# Patient Record
Sex: MALE | Race: WHITE | NOT HISPANIC OR LATINO | Employment: OTHER | URBAN - METROPOLITAN AREA
[De-identification: names, ages, dates, MRNs, and addresses within clinical notes are randomized per-mention and may not be internally consistent; named-entity substitution may affect disease eponyms.]

---

## 2018-08-23 ENCOUNTER — CONVERSION ENCOUNTER (OUTPATIENT)
Dept: FAMILY MEDICINE CLINIC | Facility: CLINIC | Age: 33
End: 2018-08-23

## 2018-08-23 ENCOUNTER — OFFICE VISIT CONVERTED (OUTPATIENT)
Dept: FAMILY MEDICINE CLINIC | Facility: CLINIC | Age: 33
End: 2018-08-23
Attending: NURSE PRACTITIONER

## 2019-02-01 ENCOUNTER — CONVERSION ENCOUNTER (OUTPATIENT)
Dept: FAMILY MEDICINE CLINIC | Facility: CLINIC | Age: 34
End: 2019-02-01

## 2019-02-27 ENCOUNTER — HOSPITAL ENCOUNTER (OUTPATIENT)
Dept: LAB | Facility: HOSPITAL | Age: 34
Discharge: HOME | End: 2019-02-27
Attending: NURSE PRACTITIONER

## 2019-02-27 LAB
25(OH)D3 SERPL-MCNC: 26.1 NG/ML (ref 30–100)
ALBUMIN SERPL-MCNC: 4.5 G/DL (ref 3.5–5)
ALBUMIN/GLOB SERPL: 1.5 {RATIO} (ref 1.4–2.6)
ALP SERPL-CCNC: 85 U/L (ref 53–128)
ALT SERPL-CCNC: 52 U/L (ref 10–40)
ANION GAP SERPL CALC-SCNC: 20 MMOL/L (ref 8–19)
AST SERPL-CCNC: 29 U/L (ref 15–50)
BASOPHILS # BLD AUTO: 0.09 10*3/UL (ref 0–0.2)
BASOPHILS NFR BLD AUTO: 0.8 % (ref 0–3)
BILIRUB SERPL-MCNC: 0.31 MG/DL (ref 0.2–1.3)
BUN SERPL-MCNC: 16 MG/DL (ref 5–25)
BUN/CREAT SERPL: 17 {RATIO} (ref 6–20)
CALCIUM SERPL-MCNC: 9.8 MG/DL (ref 8.7–10.4)
CHLORIDE SERPL-SCNC: 101 MMOL/L (ref 99–111)
CHOLEST SERPL-MCNC: 184 MG/DL (ref 107–200)
CHOLEST/HDLC SERPL: 5.9 {RATIO} (ref 3–6)
CONV ABS IMM GRAN: 0.11 10*3/UL (ref 0–0.2)
CONV CO2: 25 MMOL/L (ref 22–32)
CONV IMMATURE GRAN: 1 % (ref 0–1.8)
CONV TOTAL PROTEIN: 7.5 G/DL (ref 6.3–8.2)
CREAT UR-MCNC: 0.95 MG/DL (ref 0.7–1.2)
DEPRECATED RDW RBC AUTO: 43.2 FL (ref 35.1–43.9)
EOSINOPHIL # BLD AUTO: 0.39 10*3/UL (ref 0–0.7)
EOSINOPHIL # BLD AUTO: 3.5 % (ref 0–7)
ERYTHROCYTE [DISTWIDTH] IN BLOOD BY AUTOMATED COUNT: 12.2 % (ref 11.6–14.4)
GFR SERPLBLD BASED ON 1.73 SQ M-ARVRAT: >60 ML/MIN/{1.73_M2}
GLOBULIN UR ELPH-MCNC: 3 G/DL (ref 2–3.5)
GLUCOSE SERPL-MCNC: 100 MG/DL (ref 70–99)
HBA1C MFR BLD: 15.7 G/DL (ref 14–18)
HCT VFR BLD AUTO: 48.8 % (ref 42–52)
HDLC SERPL-MCNC: 31 MG/DL (ref 40–60)
LDLC SERPL CALC-MCNC: 105 MG/DL (ref 70–100)
LYMPHOCYTES # BLD AUTO: 3.03 10*3/UL (ref 1–5)
MCH RBC QN AUTO: 30.9 PG (ref 27–31)
MCHC RBC AUTO-ENTMCNC: 32.2 G/DL (ref 33–37)
MCV RBC AUTO: 96.1 FL (ref 80–96)
MONOCYTES # BLD AUTO: 0.86 10*3/UL (ref 0.2–1.2)
MONOCYTES NFR BLD AUTO: 7.8 % (ref 3–10)
NEUTROPHILS # BLD AUTO: 6.56 10*3/UL (ref 2–8)
NEUTROPHILS NFR BLD AUTO: 59.5 % (ref 30–85)
NRBC CBCN: 0 % (ref 0–0.7)
OSMOLALITY SERPL CALC.SUM OF ELEC: 293 MOSM/KG (ref 273–304)
PLATELET # BLD AUTO: 296 10*3/UL (ref 130–400)
PMV BLD AUTO: 9.9 FL (ref 9.4–12.4)
POTASSIUM SERPL-SCNC: 4.5 MMOL/L (ref 3.5–5.3)
RBC # BLD AUTO: 5.08 10*6/UL (ref 4.7–6.1)
SODIUM SERPL-SCNC: 141 MMOL/L (ref 135–147)
T4 FREE SERPL-MCNC: 1.1 NG/DL (ref 0.9–1.8)
TRIGL SERPL-MCNC: 505 MG/DL (ref 40–150)
TSH SERPL-ACNC: 1.11 M[IU]/L (ref 0.27–4.2)
VARIANT LYMPHS NFR BLD MANUAL: 27.4 % (ref 20–45)
WBC # BLD AUTO: 11.04 10*3/UL (ref 4.8–10.8)

## 2019-03-12 ENCOUNTER — OFFICE VISIT CONVERTED (OUTPATIENT)
Dept: FAMILY MEDICINE CLINIC | Facility: CLINIC | Age: 34
End: 2019-03-12
Attending: NURSE PRACTITIONER

## 2019-12-30 ENCOUNTER — HOSPITAL ENCOUNTER (OUTPATIENT)
Dept: LAB | Facility: HOSPITAL | Age: 34
Discharge: HOME | End: 2019-12-30
Attending: NURSE PRACTITIONER

## 2019-12-30 LAB
25(OH)D3 SERPL-MCNC: 32.5 NG/ML (ref 30–100)
ALBUMIN SERPL-MCNC: 4.4 G/DL (ref 3.5–5)
ALBUMIN/GLOB SERPL: 1.5 {RATIO} (ref 1.4–2.6)
ALP SERPL-CCNC: 85 U/L (ref 53–128)
ALT SERPL-CCNC: 49 U/L (ref 10–40)
ANION GAP SERPL CALC-SCNC: 19 MMOL/L (ref 8–19)
AST SERPL-CCNC: 27 U/L (ref 15–50)
BASOPHILS # BLD AUTO: 0.06 10*3/UL (ref 0–0.2)
BASOPHILS NFR BLD AUTO: 0.6 % (ref 0–3)
BILIRUB SERPL-MCNC: 0.32 MG/DL (ref 0.2–1.3)
BUN SERPL-MCNC: 14 MG/DL (ref 5–25)
BUN/CREAT SERPL: 16 {RATIO} (ref 6–20)
CALCIUM SERPL-MCNC: 9.5 MG/DL (ref 8.7–10.4)
CHLORIDE SERPL-SCNC: 101 MMOL/L (ref 99–111)
CHOLEST SERPL-MCNC: 115 MG/DL (ref 107–200)
CHOLEST/HDLC SERPL: 4.6 {RATIO} (ref 3–6)
CONV ABS IMM GRAN: 0.03 10*3/UL (ref 0–0.2)
CONV CO2: 23 MMOL/L (ref 22–32)
CONV IMMATURE GRAN: 0.3 % (ref 0–1.8)
CONV TOTAL PROTEIN: 7.4 G/DL (ref 6.3–8.2)
CREAT UR-MCNC: 0.9 MG/DL (ref 0.7–1.2)
DEPRECATED RDW RBC AUTO: 41.5 FL (ref 35.1–43.9)
EOSINOPHIL # BLD AUTO: 0.47 10*3/UL (ref 0–0.7)
EOSINOPHIL # BLD AUTO: 4.5 % (ref 0–7)
ERYTHROCYTE [DISTWIDTH] IN BLOOD BY AUTOMATED COUNT: 12.1 % (ref 11.6–14.4)
EST. AVERAGE GLUCOSE BLD GHB EST-MCNC: 108 MG/DL
FOLATE SERPL-MCNC: >20 NG/ML (ref 4.8–20)
GFR SERPLBLD BASED ON 1.73 SQ M-ARVRAT: >60 ML/MIN/{1.73_M2}
GLOBULIN UR ELPH-MCNC: 3 G/DL (ref 2–3.5)
GLUCOSE SERPL-MCNC: 94 MG/DL (ref 70–99)
HBA1C MFR BLD: 5.4 % (ref 3.5–5.7)
HCT VFR BLD AUTO: 47 % (ref 42–52)
HDLC SERPL-MCNC: 25 MG/DL (ref 40–60)
HGB BLD-MCNC: 15.7 G/DL (ref 14–18)
IRON SERPL-MCNC: 96 UG/DL (ref 70–180)
LDLC SERPL CALC-MCNC: 32 MG/DL (ref 70–100)
LYMPHOCYTES # BLD AUTO: 3.71 10*3/UL (ref 1–5)
LYMPHOCYTES NFR BLD AUTO: 35.6 % (ref 20–45)
MCH RBC QN AUTO: 31.1 PG (ref 27–31)
MCHC RBC AUTO-ENTMCNC: 33.4 G/DL (ref 33–37)
MCV RBC AUTO: 93.1 FL (ref 80–96)
MONOCYTES # BLD AUTO: 0.67 10*3/UL (ref 0.2–1.2)
MONOCYTES NFR BLD AUTO: 6.4 % (ref 3–10)
NEUTROPHILS # BLD AUTO: 5.49 10*3/UL (ref 2–8)
NEUTROPHILS NFR BLD AUTO: 52.6 % (ref 30–85)
NRBC CBCN: 0 % (ref 0–0.7)
OSMOLALITY SERPL CALC.SUM OF ELEC: 288 MOSM/KG (ref 273–304)
PLATELET # BLD AUTO: 264 10*3/UL (ref 130–400)
PMV BLD AUTO: 10 FL (ref 9.4–12.4)
POTASSIUM SERPL-SCNC: 4.2 MMOL/L (ref 3.5–5.3)
RBC # BLD AUTO: 5.05 10*6/UL (ref 4.7–6.1)
SODIUM SERPL-SCNC: 139 MMOL/L (ref 135–147)
TRIGL SERPL-MCNC: 289 MG/DL (ref 40–150)
TSH SERPL-ACNC: 1.59 M[IU]/L (ref 0.27–4.2)
VIT B12 SERPL-MCNC: 738 PG/ML (ref 211–911)
VLDLC SERPL-MCNC: 58 MG/DL (ref 5–37)
WBC # BLD AUTO: 10.43 10*3/UL (ref 4.8–10.8)

## 2021-05-15 VITALS
OXYGEN SATURATION: 98 % | RESPIRATION RATE: 18 BRPM | TEMPERATURE: 98 F | SYSTOLIC BLOOD PRESSURE: 113 MMHG | HEART RATE: 83 BPM | WEIGHT: 226 LBS | DIASTOLIC BLOOD PRESSURE: 76 MMHG | BODY MASS INDEX: 33.47 KG/M2 | HEIGHT: 69 IN

## 2021-05-15 VITALS
WEIGHT: 234 LBS | HEART RATE: 80 BPM | RESPIRATION RATE: 18 BRPM | OXYGEN SATURATION: 100 % | BODY MASS INDEX: 34.66 KG/M2 | SYSTOLIC BLOOD PRESSURE: 134 MMHG | TEMPERATURE: 97.6 F | DIASTOLIC BLOOD PRESSURE: 80 MMHG | HEIGHT: 69 IN

## 2021-05-16 VITALS
SYSTOLIC BLOOD PRESSURE: 122 MMHG | HEIGHT: 69 IN | RESPIRATION RATE: 16 BRPM | WEIGHT: 226 LBS | HEART RATE: 91 BPM | DIASTOLIC BLOOD PRESSURE: 69 MMHG | OXYGEN SATURATION: 97 % | BODY MASS INDEX: 33.47 KG/M2

## 2021-12-17 RX ORDER — LORATADINE 10 MG/1
TABLET ORAL
COMMUNITY
End: 2022-02-17

## 2021-12-17 RX ORDER — OMEGA-3/DHA/EPA/FISH OIL 60 MG-90MG
CAPSULE ORAL
COMMUNITY

## 2021-12-17 RX ORDER — FEXOFENADINE HCL 180 MG/1
TABLET ORAL
COMMUNITY

## 2021-12-20 ENCOUNTER — OFFICE VISIT (OUTPATIENT)
Dept: SURGERY | Facility: CLINIC | Age: 36
End: 2021-12-20

## 2021-12-20 VITALS — HEIGHT: 70 IN | RESPIRATION RATE: 15 BRPM | WEIGHT: 270 LBS | BODY MASS INDEX: 38.65 KG/M2

## 2021-12-20 DIAGNOSIS — R59.1 LYMPHADENOPATHY: Primary | ICD-10-CM

## 2021-12-20 PROBLEM — S09.90XA HEAD INJURY: Status: ACTIVE | Noted: 2021-12-20

## 2021-12-20 PROBLEM — L30.9 ECZEMA: Status: ACTIVE | Noted: 2021-12-20

## 2021-12-20 PROBLEM — R73.01 IMPAIRED FASTING GLUCOSE: Status: ACTIVE | Noted: 2019-03-12

## 2021-12-20 PROBLEM — G43.909 MIGRAINE: Status: ACTIVE | Noted: 2021-12-20

## 2021-12-20 PROBLEM — K80.20 GALLSTONES: Status: ACTIVE | Noted: 2021-12-20

## 2021-12-20 PROBLEM — J01.80 OTHER ACUTE SINUSITIS: Status: ACTIVE | Noted: 2021-12-20

## 2021-12-20 PROBLEM — E55.9 VITAMIN D DEFICIENCY: Status: ACTIVE | Noted: 2019-03-12

## 2021-12-20 PROBLEM — F41.9 ANXIETY: Status: ACTIVE | Noted: 2021-12-20

## 2021-12-20 PROBLEM — E78.5 HYPERLIPIDEMIA: Status: ACTIVE | Noted: 2019-03-12

## 2021-12-20 PROCEDURE — 99203 OFFICE O/P NEW LOW 30 MIN: CPT | Performed by: SURGERY

## 2021-12-20 RX ORDER — VENLAFAXINE HYDROCHLORIDE 150 MG/1
150 CAPSULE, EXTENDED RELEASE ORAL DAILY
COMMUNITY
Start: 2021-11-08

## 2021-12-20 RX ORDER — CETIRIZINE HYDROCHLORIDE 10 MG/1
TABLET ORAL
COMMUNITY
Start: 2020-12-01

## 2021-12-20 RX ORDER — PROPRANOLOL HYDROCHLORIDE 10 MG/1
10 TABLET ORAL 2 TIMES DAILY
COMMUNITY
Start: 2021-10-19

## 2021-12-20 RX ORDER — MELATONIN
COMMUNITY
Start: 2020-12-20

## 2021-12-20 RX ORDER — ERGOCALCIFEROL 1.25 MG/1
50000 CAPSULE ORAL DAILY
COMMUNITY
Start: 2021-09-20 | End: 2022-02-17

## 2021-12-20 RX ORDER — LOSARTAN POTASSIUM 25 MG/1
25 TABLET ORAL EVERY MORNING
COMMUNITY
Start: 2021-10-23

## 2021-12-20 RX ORDER — AMOXICILLIN 875 MG/1
TABLET, COATED ORAL
COMMUNITY
Start: 2021-11-15 | End: 2022-02-17

## 2021-12-20 RX ORDER — TRIAMCINOLONE ACETONIDE 5 MG/G
OINTMENT TOPICAL
COMMUNITY
Start: 2021-12-07

## 2021-12-20 RX ORDER — VENLAFAXINE HYDROCHLORIDE 75 MG/1
75 CAPSULE, EXTENDED RELEASE ORAL
COMMUNITY
Start: 2021-10-11 | End: 2022-02-17

## 2021-12-20 RX ORDER — ATORVASTATIN CALCIUM 40 MG/1
40 TABLET, FILM COATED ORAL
COMMUNITY
Start: 2021-10-11

## 2021-12-20 RX ORDER — AMOXICILLIN AND CLAVULANATE POTASSIUM 875; 125 MG/1; MG/1
1 TABLET, FILM COATED ORAL EVERY 12 HOURS
COMMUNITY
Start: 2021-12-15 | End: 2022-02-17

## 2021-12-20 NOTE — PROGRESS NOTES
Chief Complaint: Swollen Glands    Subjective         History of Present Illness  Lucho Sauer is a 36 y.o. male presents to [facility] to be seen for enlarged cervical lymph node.  His ultrasound showed a 1.4 cm structure in right lateral posterior neck consistent with a benign lymph node.  He does use chewing tobacco and has for several years.  He states that he recently had an ear infection and had multiple lymph nodes with large that if all decreased in size after completing an antibiotic.  He feels that the node in question has also decreased in size since the time of the ultrasound.  We will plan to have another ultrasound in 1 month just to confirm that most likely this is a benign reactive node due to a head neck infection.  There were no lesions seen on oral exam.        SCANNED - IMAGING [[id number]] (Order [id number])  Order  Status: Final result     Appointment Information      Scans on Order [id number]    Scan on 11/16/2021 by [facility] On[facility], [place]: ULTRASOUND(NECK)_[facility] IMAG[facility]_11/16/21                Objective     History reviewed. No pertinent past medical history.    History reviewed. No pertinent surgical history.      Current Outpatient Medications:   •  amoxicillin (AMOXIL) 875 MG tablet, TAKE 1 TABLET BY MOUTH EVERY 12 HOURS FOR 5 DAYS, Disp: , Rfl:   •  amoxicillin-clavulanate (AUGMENTIN) 875-125 MG per tablet, Take 1 tablet by mouth Every 12 (Twelve) Hours., Disp: , Rfl:   •  Ascorbic Acid 500 MG chewable tablet, Vitamin C 500 mg oral tablet,chewable chew 1 tablet by oral route daily   Suspended, Disp: , Rfl:   •  atorvastatin (LIPITOR) 40 MG tablet, Take 40 mg by mouth every night at bedtime., Disp: , Rfl:   •  cetirizine (zyrTEC) 10 MG tablet, , Disp: , Rfl:   •  cholecalciferol (Vitamin D) 25 MCG (1000 UT) tablet, , Disp: , Rfl:   •  fexofenadine (Allegra Allergy) 180 MG tablet, Allegra Allergy 180 mg oral tablet take 1 tablet (180 mg) by oral route once daily  "  Active, Disp: , Rfl:   •  loratadine (Claritin) 10 MG tablet, Claritin 10 mg oral tablet take 1 tablet (10 mg) by oral route once daily   Active, Disp: , Rfl:   •  losartan (COZAAR) 25 MG tablet, Take 25 mg by mouth Every Morning., Disp: , Rfl:   •  Multiple Vitamin (MULTIVITAMINS PO), Multivitamin one po daily   Active, Disp: , Rfl:   •  Omega-3 Fatty Acids (fish oil) 500 MG capsule capsule, Fish Oil 500 mg oral capsule take 1 capsule by oral route 2 times a day   Active, Disp: , Rfl:   •  propranolol (INDERAL) 10 MG tablet, Take 10 mg by mouth 2 (Two) Times a Day., Disp: , Rfl:   •  triamcinolone (KENALOG) 0.5 % ointment, APPLY PEA SIZED AMOUNT TO THE AFFECTED AREA TWICE DAILY, Disp: , Rfl:   •  venlafaxine XR (EFFEXOR-XR) 150 MG 24 hr capsule, Take 150 mg by mouth Daily., Disp: , Rfl:   •  venlafaxine XR (EFFEXOR-XR) 75 MG 24 hr capsule, Take 75 mg by mouth every night at bedtime., Disp: , Rfl:   •  vitamin D (ERGOCALCIFEROL) 1.25 MG (95779 UT) capsule capsule, Take 50,000 Units by mouth Daily., Disp: , Rfl:     Allergies   Allergen Reactions   • Beef Allergy Unknown - High Severity        History reviewed. No pertinent family history.    Social History     Socioeconomic History   • Marital status:    Tobacco Use   • Smoking status: Current Some Day Smoker   • Smokeless tobacco: Current User   Vaping Use   • Vaping Use: Never used       Vital Signs:   Resp 15   Ht 177.8 cm (70\")   Wt 122 kg (270 lb)   BMI 38.74 kg/m²    Review of Systems    Physical Exam  Vitals and nursing note reviewed.   Constitutional:       General: He is not in acute distress.     Appearance: Normal appearance. He is well-developed.   HENT:      Head: Normocephalic and atraumatic.   Eyes:      Extraocular Movements: Extraocular movements intact.      Pupils: Pupils are equal, round, and reactive to light.   Neck:      Comments: Right side mildly enlarged posterior lateral lymph node  Cardiovascular:      Pulses: Normal pulses. "   Pulmonary:      Effort: Pulmonary effort is normal. No retractions.      Breath sounds: Normal air entry. No wheezing.   Abdominal:      General: There is no distension.      Palpations: Abdomen is soft.      Tenderness: There is no abdominal tenderness.      Hernia: No hernia is present.   Musculoskeletal:         General: No swelling or deformity.      Cervical back: Neck supple.   Lymphadenopathy:      Cervical: Cervical adenopathy present.   Skin:     General: Skin is warm and dry.      Findings: No erythema.   Neurological:      General: No focal deficit present.      Mental Status: He is alert and oriented to person, place, and time.      Motor: Motor function is intact.   Psychiatric:         Mood and Affect: Mood normal.         Thought Content: Thought content normal.      Oral exam with no suspicious lesions seen    Result Review :               Assessment and Plan    Diagnoses and all orders for this visit:    1. Lymphadenopathy (Primary)  -     US Head Neck Soft Tissue; Future      Followup after repeat imaging in 1 month    Follow Up   Return in about 2 months (around 2/20/2022), or if symptoms worsen or fail to improve.  Patient was given instructions and counseling regarding his condition or for health maintenance advice. Please see specific information pulled into the AVS if appropriate.         This document has been electronically signed by Jo-Ann Kurtz MD  December 20, 2021 09:11 EST

## 2022-01-20 ENCOUNTER — HOSPITAL ENCOUNTER (OUTPATIENT)
Dept: ULTRASOUND IMAGING | Facility: HOSPITAL | Age: 37
Discharge: HOME | End: 2022-01-20
Admitting: SURGERY

## 2022-01-20 DIAGNOSIS — R59.1 LYMPHADENOPATHY: ICD-10-CM

## 2022-01-20 PROCEDURE — 76536 US EXAM OF HEAD AND NECK: CPT

## 2022-01-25 ENCOUNTER — TELEPHONE (OUTPATIENT)
Dept: SURGERY | Facility: CLINIC | Age: 37
End: 2022-01-25

## 2022-01-25 DIAGNOSIS — R59.1 LYMPHADENOPATHY: Primary | ICD-10-CM

## 2022-01-25 NOTE — TELEPHONE ENCOUNTER
Pts wife called and wanted the results of the patient's ultrasound that was done last week.      [phone]

## 2022-01-26 ENCOUNTER — TELEPHONE (OUTPATIENT)
Dept: OTOLARYNGOLOGY | Facility: CLINIC | Age: 37
End: 2022-01-26

## 2022-01-26 NOTE — TELEPHONE ENCOUNTER
Caller: BALJEET NELSONBS    Relationship: [facility]    Best call back number: [phone]    What orders are you requesting (i.e. lab or imaging): IMAGING-CT SOFT TISSUE NECK    In what timeframe would the patient need to come in: PRIOR TO 2/17/22 APPT WITH     Where will you receive your lab/imaging services:  [facility]    Additional notes: [facility] SCHEDULED NEW PT APPT WITH ORDERS FOR CT PRIOR. PT IS AWAITING AN APPT FOR IMAGING TO BE DONE OR TO KNOW IF THIS IS WALK-IN. WOULD LIKE CALLBACK.

## 2022-02-14 ENCOUNTER — HOSPITAL ENCOUNTER (OUTPATIENT)
Dept: CT IMAGING | Facility: HOSPITAL | Age: 37
Discharge: HOME | End: 2022-02-14
Admitting: SURGERY

## 2022-02-14 DIAGNOSIS — R59.1 LYMPHADENOPATHY: ICD-10-CM

## 2022-02-14 LAB — CREAT BLDA-MCNC: 0.9 MG/DL

## 2022-02-14 PROCEDURE — 0 IOPAMIDOL PER 1 ML: Performed by: SURGERY

## 2022-02-14 PROCEDURE — 70491 CT SOFT TISSUE NECK W/DYE: CPT

## 2022-02-14 PROCEDURE — 82565 ASSAY OF CREATININE: CPT

## 2022-02-14 RX ADMIN — IOPAMIDOL 100 ML: 755 INJECTION, SOLUTION INTRAVENOUS at 10:25

## 2022-02-17 ENCOUNTER — OFFICE VISIT (OUTPATIENT)
Dept: OTOLARYNGOLOGY | Facility: CLINIC | Age: 37
End: 2022-02-17

## 2022-02-17 VITALS — WEIGHT: 262 LBS | HEIGHT: 70 IN | TEMPERATURE: 99.3 F | BODY MASS INDEX: 37.51 KG/M2

## 2022-02-17 DIAGNOSIS — J01.80 ACUTE NON-RECURRENT SINUSITIS OF OTHER SINUS: ICD-10-CM

## 2022-02-17 DIAGNOSIS — R59.1 LYMPHADENOPATHY: Primary | ICD-10-CM

## 2022-02-17 PROCEDURE — 99204 OFFICE O/P NEW MOD 45 MIN: CPT | Performed by: OTOLARYNGOLOGY

## 2022-02-17 NOTE — PROGRESS NOTES
Patient Name: Lucho Sauer   Visit Date: 02/17/2022   Patient ID: [id number]  Provider: Chase Mathew MD    Sex: male  Location: [facility]   YOB: 1985  Location Address: [address] [place] [address], [address] [place] [address],?[place]?[id number]    Primary Care Provider Vanessa Lisa APRCASEY  Location Phone: [phone]    Referring Provider: Jo-Ann Kurtz MD        Chief Complaint  New Patient and Swollen Glands (present for 3 months; getting larger)    Subjective    History of Present Illness  Lucho Sauer is a 36 y.o. male who presents to [facility] today as a consult from Jo-Ann Kurtz MD.    He presents the clinic today for evaluation of lymphadenopathy that initially started along the right posterior neck without any antecedent events or illness in December.  He denies any fatigue, malaise, unexpected weight loss, fevers, night sweats, or chills.  Initially an ultrasound was performed by the primary care physician revealing a 1.7 x 0.4 x 0.9 cm right posterior lymph node which had a normal fatty hilum and was described to likely be reactive in nature.  For whatever reason the patient ended up undergoing another ultrasound and ultimately getting referred to general surgeon who had ordered a CT scan of his neck.  This revealed an 8 mm lymph node.  I personally reviewed the imaging with him today and gave him my interpretation.  The patient notes that at that point the lymph node had decreased significantly in size after he had finished a course of antibiotics.  He denies any symptoms or pain from this now.    Past Medical History:   Diagnosis Date   • Anxiety    • Fracture of nasal bones    • Headache    • Hyperlipidemia    • Melanoma (HCC)    • Nosebleed        Past Surgical History:   Procedure Laterality Date   • NASAL FRACTURE SURGERY           Current Outpatient Medications:   •  atorvastatin (LIPITOR) 40 MG tablet, Take 40 mg by mouth every  "night at bedtime., Disp: , Rfl:   •  cetirizine (zyrTEC) 10 MG tablet, , Disp: , Rfl:   •  cholecalciferol (Vitamin D) 25 MCG (1000 UT) tablet, , Disp: , Rfl:   •  fexofenadine (Allegra Allergy) 180 MG tablet, Allegra Allergy 180 mg oral tablet take 1 tablet (180 mg) by oral route once daily   Active, Disp: , Rfl:   •  losartan (COZAAR) 25 MG tablet, Take 25 mg by mouth Every Morning., Disp: , Rfl:   •  Multiple Vitamin (MULTIVITAMINS PO), Multivitamin one po daily   Active, Disp: , Rfl:   •  Omega-3 Fatty Acids (fish oil) 500 MG capsule capsule, Fish Oil 500 mg oral capsule take 1 capsule by oral route 2 times a day   Active, Disp: , Rfl:   •  propranolol (INDERAL) 10 MG tablet, Take 10 mg by mouth 2 (Two) Times a Day., Disp: , Rfl:   •  triamcinolone (KENALOG) 0.5 % ointment, APPLY PEA SIZED AMOUNT TO THE AFFECTED AREA TWICE DAILY, Disp: , Rfl:   •  venlafaxine XR (EFFEXOR-XR) 150 MG 24 hr capsule, Take 150 mg by mouth Daily., Disp: , Rfl:      Allergies   Allergen Reactions   • Beef Allergy Unknown - High Severity       Family History   Problem Relation Age of Onset   • Heart failure Father    • Hypertension Father         Social History     Social History Narrative   • Not on file       Objective     Vital Signs:   Temp 99.3 °F (37.4 °C) (Temporal)   Ht 177.8 cm (70\")   Wt 119 kg (262 lb)   BMI 37.59 kg/m²       Physical Exam         Constitutional   Appearance  · : well developed, well-nourished, alert and in no acute distress, voice clear and strong    Head  Inspection  · : no deformities or lesions  Face  Inspection  · : No facial lesions; House-Brackmann I/VI bilaterally  Palpation  · : No TMJ crepitus nor  muscle tenderness bilaterally    Eyes  Vision  Visual Fields  · : Extraocular movements are intact. No spontaneous or gaze-induced nystagmus.  Conjunctivae  · : clear  Sclerae  · : clear  Pupils and Irises  · : pupils equal, round, and reactive to light.     Ears, Nose, Mouth and " Throat    Ears    External Ears  · : appearance within normal limits, no lesions present  Otoscopic Examination  · : Tympanic membrane appearance within normal limits bilaterally without perforations, well-aerated middle ears  Hearing  · : intact to conversational voice both ears  Tunning fork testing:     :    Nose    External Nose  · : appearance normal  Intranasal Exam  · : mucosa within normal limits, vestibules normal, no intranasal lesions present, septum midline, sinuses non tender to percussion  Oral Cavity    Oral Mucosa  · : oral mucosa normal without pallor or cyanosis  Lips  · : lip appearance normal  Teeth  · : normal dentition for age  Gums  · : gums pink, non-swollen, no bleeding present  Tongue  · : tongue appearance normal; normal mobility  Palate  · : hard palate normal, soft palate appearance normal with symmetric mobility    Throat    Oropharynx  · : no inflammation or lesions present, tonsils within normal limits  Hypopharynx  · : appearance within normal limits, superior epiglottis within normal limits  Larynx  · : appearance within normal limits, vocal cords within normal limits, no lesions present    Neck  Inspection/Palpation  · : normal appearance, no masses or tenderness, trachea midline; thyroid size normal, nontender, no nodules or masses present on palpation    Respiratory  Respiratory Effort  · : breathing unlabored  Inspection of Chest  · : normal appearance, no retractions    Cardiovascular  Heart  · : regular rate and rhythm    Lymphatic  Neck  · : no lymphadenopathy present  Supraclavicular Nodes  · : no lymphadenopathy present  Preauricular Nodes  · : no lymphadenopathy present    Skin and Subcutaneous Tissue  General Inspection  · : Regarding face and neck - there are no rashes present, no lesions present, and no areas of discoloration    Neurologic  Cranial Nerves  · : cranial nerves II-XII are grossly intact bilaterally  Gait and Station  · : normal gait, able to stand without  diffculty    Psychiatric  Judgement and Insight  · : judgment and insight intact  Mood and Affect  · : mood normal, affect appropriate          Assessment and Plan    Diagnoses and all orders for this visit:    1. Lymphadenopathy (Primary)    2. Acute non-recurrent sinusitis of other sinus    Examination today revealed a small mobile nontender lymph node along the right posterior neck just inferior and posterior to the mastoid.  It is about 8 mm in size.  There are no overlying skin changes.  In reviewing the work-up I believe this was a reactive lymph node which has now improved.  I discussed the findings with the patient and did recommend self-exam every week.  I will see him back in the clinic should there be any worsening.    Follow Up   No follow-ups on file.  Patient was given instructions and counseling regarding his condition or for health maintenance advice. Please see specific information pulled into the AVS if appropriate.

## 2022-10-19 ENCOUNTER — APPOINTMENT (OUTPATIENT)
Dept: GENERAL RADIOLOGY | Facility: HOSPITAL | Age: 37
End: 2022-10-19

## 2022-10-19 ENCOUNTER — HOSPITAL ENCOUNTER (EMERGENCY)
Facility: HOSPITAL | Age: 37
Discharge: HOME | End: 2022-10-19
Attending: EMERGENCY MEDICINE | Admitting: EMERGENCY MEDICINE

## 2022-10-19 VITALS
RESPIRATION RATE: 16 BRPM | DIASTOLIC BLOOD PRESSURE: 82 MMHG | BODY MASS INDEX: 39.25 KG/M2 | WEIGHT: 264.99 LBS | SYSTOLIC BLOOD PRESSURE: 127 MMHG | HEART RATE: 75 BPM | OXYGEN SATURATION: 95 % | HEIGHT: 69 IN | TEMPERATURE: 98.2 F

## 2022-10-19 DIAGNOSIS — R10.12 LEFT UPPER QUADRANT ABDOMINAL PAIN: Primary | ICD-10-CM

## 2022-10-19 LAB
ALBUMIN SERPL-MCNC: 4.7 G/DL (ref 3.5–5.2)
ALBUMIN/GLOB SERPL: 1.6 G/DL
ALP SERPL-CCNC: 98 U/L (ref 39–117)
ALT SERPL W P-5'-P-CCNC: 58 U/L (ref 1–41)
ANION GAP SERPL CALCULATED.3IONS-SCNC: 11.6 MMOL/L (ref 5–15)
AST SERPL-CCNC: 34 U/L (ref 1–40)
BACTERIA UR QL AUTO: ABNORMAL /HPF
BASOPHILS # BLD AUTO: 0.09 10*3/MM3 (ref 0–0.2)
BASOPHILS NFR BLD AUTO: 0.7 % (ref 0–1.5)
BILIRUB SERPL-MCNC: 0.3 MG/DL (ref 0–1.2)
BILIRUB UR QL STRIP: NEGATIVE
BUN SERPL-MCNC: 12 MG/DL (ref 6–20)
BUN/CREAT SERPL: 11.7 (ref 7–25)
CALCIUM SPEC-SCNC: 9.2 MG/DL (ref 8.6–10.5)
CHLORIDE SERPL-SCNC: 101 MMOL/L (ref 98–107)
CLARITY UR: CLEAR
CO2 SERPL-SCNC: 26.4 MMOL/L (ref 22–29)
COLOR UR: YELLOW
CREAT SERPL-MCNC: 1.03 MG/DL (ref 0.76–1.27)
DEPRECATED RDW RBC AUTO: 41.8 FL (ref 37–54)
EGFRCR SERPLBLD CKD-EPI 2021: 95.4 ML/MIN/1.73
EOSINOPHIL # BLD AUTO: 0.31 10*3/MM3 (ref 0–0.4)
EOSINOPHIL NFR BLD AUTO: 2.5 % (ref 0.3–6.2)
ERYTHROCYTE [DISTWIDTH] IN BLOOD BY AUTOMATED COUNT: 12 % (ref 12.3–15.4)
GLOBULIN UR ELPH-MCNC: 3 GM/DL
GLUCOSE SERPL-MCNC: 124 MG/DL (ref 65–99)
GLUCOSE UR STRIP-MCNC: NEGATIVE MG/DL
HCT VFR BLD AUTO: 46.4 % (ref 37.5–51)
HGB BLD-MCNC: 15.8 G/DL (ref 13–17.7)
HGB UR QL STRIP.AUTO: ABNORMAL
HOLD SPECIMEN: NORMAL
HOLD SPECIMEN: NORMAL
HYALINE CASTS UR QL AUTO: ABNORMAL /LPF
IMM GRANULOCYTES # BLD AUTO: 0.07 10*3/MM3 (ref 0–0.05)
IMM GRANULOCYTES NFR BLD AUTO: 0.6 % (ref 0–0.5)
KETONES UR QL STRIP: NEGATIVE
LEUKOCYTE ESTERASE UR QL STRIP.AUTO: NEGATIVE
LIPASE SERPL-CCNC: 27 U/L (ref 13–60)
LYMPHOCYTES # BLD AUTO: 3.28 10*3/MM3 (ref 0.7–3.1)
LYMPHOCYTES NFR BLD AUTO: 26.4 % (ref 19.6–45.3)
MCH RBC QN AUTO: 31.9 PG (ref 26.6–33)
MCHC RBC AUTO-ENTMCNC: 34.1 G/DL (ref 31.5–35.7)
MCV RBC AUTO: 93.5 FL (ref 79–97)
MONOCYTES # BLD AUTO: 0.64 10*3/MM3 (ref 0.1–0.9)
MONOCYTES NFR BLD AUTO: 5.1 % (ref 5–12)
MUCOUS THREADS URNS QL MICRO: ABNORMAL /HPF
NEUTROPHILS NFR BLD AUTO: 64.7 % (ref 42.7–76)
NEUTROPHILS NFR BLD AUTO: 8.04 10*3/MM3 (ref 1.7–7)
NITRITE UR QL STRIP: NEGATIVE
NRBC BLD AUTO-RTO: 0 /100 WBC (ref 0–0.2)
PH UR STRIP.AUTO: 6 [PH] (ref 5–8)
PLATELET # BLD AUTO: 267 10*3/MM3 (ref 140–450)
PMV BLD AUTO: 9.2 FL (ref 6–12)
POTASSIUM SERPL-SCNC: 4.2 MMOL/L (ref 3.5–5.2)
PROT SERPL-MCNC: 7.7 G/DL (ref 6–8.5)
PROT UR QL STRIP: NEGATIVE
RBC # BLD AUTO: 4.96 10*6/MM3 (ref 4.14–5.8)
RBC # UR STRIP: ABNORMAL /HPF
REF LAB TEST METHOD: ABNORMAL
SODIUM SERPL-SCNC: 139 MMOL/L (ref 136–145)
SP GR UR STRIP: 1.02 (ref 1–1.03)
SQUAMOUS #/AREA URNS HPF: ABNORMAL /HPF
UROBILINOGEN UR QL STRIP: ABNORMAL
WBC # UR STRIP: ABNORMAL /HPF
WBC NRBC COR # BLD: 12.43 10*3/MM3 (ref 3.4–10.8)
WHOLE BLOOD HOLD COAG: NORMAL
WHOLE BLOOD HOLD SPECIMEN: NORMAL

## 2022-10-19 PROCEDURE — 74019 RADEX ABDOMEN 2 VIEWS: CPT

## 2022-10-19 PROCEDURE — 80053 COMPREHEN METABOLIC PANEL: CPT | Performed by: EMERGENCY MEDICINE

## 2022-10-19 PROCEDURE — 99283 EMERGENCY DEPT VISIT LOW MDM: CPT

## 2022-10-19 PROCEDURE — 85025 COMPLETE CBC W/AUTO DIFF WBC: CPT | Performed by: EMERGENCY MEDICINE

## 2022-10-19 PROCEDURE — 83690 ASSAY OF LIPASE: CPT | Performed by: EMERGENCY MEDICINE

## 2022-10-19 PROCEDURE — 36415 COLL VENOUS BLD VENIPUNCTURE: CPT

## 2022-10-19 PROCEDURE — 81001 URINALYSIS AUTO W/SCOPE: CPT | Performed by: EMERGENCY MEDICINE

## 2022-10-19 RX ORDER — DICYCLOMINE HCL 20 MG
20 TABLET ORAL EVERY 6 HOURS PRN
Qty: 12 TABLET | Refills: 0 | Status: SHIPPED | OUTPATIENT
Start: 2022-10-19

## 2022-10-19 RX ORDER — SODIUM CHLORIDE 0.9 % (FLUSH) 0.9 %
10 SYRINGE (ML) INJECTION AS NEEDED
Status: DISCONTINUED | OUTPATIENT
Start: 2022-10-19 | End: 2022-10-19 | Stop reason: HOSPADM

## 2022-10-19 NOTE — ED PROVIDER NOTES
Time: 9:18 AM EDT  Arrived by: private car  Chief Complaint: abdominal pain  History provided by: pt  History is limited by: N/A     History of Present Illness:  Patient is a 37 y.o. year old male who presents to the emergency department with left  lateral abdominal pain which started around  Monday morning. Pt report it feels like a swelling. The pain is radiating is to the back and yesterday the pain was radiating to the front. Denies rash, belching, flatulence, blood in stool, incontinence, fever, diarrhea vomiting, nausea, hematuria. Pt feels chills due to the cold weather and has cough due to smoking cigars. Pt reports having diarrhea. Pt describes having loose stools from Monday morning. He states he has been having watery bowel movements for the last two days. Pt reports that he normally has one bowel movement a day. Positioning can worsen or better the pain. Pt reports he is uncomfortable and the pain is 1/10 right now.      History provided by:  Patient   used: No        Similar Symptoms Previously: no  Recently seen: no      Patient Care Team  Primary Care Provider: Lisa Mendez APRN    Past Medical History:     Allergies   Allergen Reactions   • Beef Allergy Unknown - High Severity     Past Medical History:   Diagnosis Date   • Anxiety    • Fracture of nasal bones    • Headache    • Hyperlipidemia    • Melanoma (HCC)    • Nosebleed      Past Surgical History:   Procedure Laterality Date   • NASAL FRACTURE SURGERY       Family History   Problem Relation Age of Onset   • Heart failure Father    • Hypertension Father        Home Medications:  Prior to Admission medications    Medication Sig Start Date End Date Taking? Authorizing Provider   atorvastatin (LIPITOR) 40 MG tablet Take 40 mg by mouth every night at bedtime. 10/11/21   Tammie Schuler MD   cetirizine (zyrTEC) 10 MG tablet  12/1/20   Tammie Schuler MD   cholecalciferol (Vitamin D) 25 MCG (1000 UT) tablet  12/20/20    Tammie Schuler MD   fexofenadine (Allegra Allergy) 180 MG tablet Allegra Allergy 180 mg oral tablet take 1 tablet (180 mg) by oral route once daily   Active    Tammie Schuler MD   losartan (COZAAR) 25 MG tablet Take 25 mg by mouth Every Morning. 10/23/21   Tammie Schuler MD   Multiple Vitamin (MULTIVITAMINS PO) Multivitamin one po daily   Active    Tammie Schuler MD   Omega-3 Fatty Acids (fish oil) 500 MG capsule capsule Fish Oil 500 mg oral capsule take 1 capsule by oral route 2 times a day   Active    Tammie Schuler MD   propranolol (INDERAL) 10 MG tablet Take 10 mg by mouth 2 (Two) Times a Day. 10/19/21   Tammie Schuler MD   triamcinolone (KENALOG) 0.5 % ointment APPLY PEA SIZED AMOUNT TO THE AFFECTED AREA TWICE DAILY 12/7/21   Tammie Schuler MD   venlafaxine XR (EFFEXOR-XR) 150 MG 24 hr capsule Take 150 mg by mouth Daily. 11/8/21   Tammie Schuler MD        Social History:   Social History     Tobacco Use   • Smoking status: Some Days     Types: Cigars   • Smokeless tobacco: Current   Vaping Use   • Vaping Use: Never used   Substance Use Topics   • Alcohol use: Never   • Drug use: Never     Recent travel: not applicable     Review of Systems:  Review of Systems   Constitutional: Positive for chills (due to cold weather outside). Negative for fever.   HENT: Negative for congestion, rhinorrhea and sore throat.    Eyes: Negative for photophobia.   Respiratory: Positive for cough (smoking cigars in the morning). Negative for apnea, chest tightness and shortness of breath.    Cardiovascular: Negative for chest pain and palpitations.   Gastrointestinal: Positive for abdominal pain and diarrhea. Negative for nausea and vomiting.        Feels abdominal swelling but no physical swelling present   Endocrine: Negative.    Genitourinary: Negative for difficulty urinating and dysuria.   Musculoskeletal: Negative for back pain, joint swelling and myalgias.   Skin:  "Negative for color change and wound.   Allergic/Immunologic: Negative.    Neurological: Negative for seizures and headaches.   Psychiatric/Behavioral: Negative.    All other systems reviewed and are negative.       Physical Exam:  /82   Pulse 75   Temp 98.2 °F (36.8 °C) (Oral)   Resp 16   Ht 175.3 cm (69\")   Wt 120 kg (264 lb 15.9 oz)   SpO2 95%   BMI 39.13 kg/m²     Physical Exam  Vitals and nursing note reviewed.   Constitutional:       General: He is awake.      Appearance: Normal appearance.   HENT:      Head: Normocephalic and atraumatic.      Nose: Nose normal.      Mouth/Throat:      Mouth: Mucous membranes are moist.   Eyes:      Extraocular Movements: Extraocular movements intact.      Pupils: Pupils are equal, round, and reactive to light.   Cardiovascular:      Rate and Rhythm: Normal rate and regular rhythm.      Heart sounds: Normal heart sounds.   Pulmonary:      Effort: Pulmonary effort is normal. No respiratory distress.      Breath sounds: Normal breath sounds. No wheezing, rhonchi or rales.   Abdominal:      General: Bowel sounds are normal.      Palpations: Abdomen is soft. There is no hepatomegaly, mass (no palpable ) or pulsatile mass.      Tenderness: There is no abdominal tenderness. There is no right CVA tenderness, left CVA tenderness, guarding or rebound.      Comments: No rigidity  Left lateral abdominal tenderness to palpation and left lower abdominal tenderness to palpation   Musculoskeletal:         General: No tenderness. Normal range of motion.      Cervical back: Normal range of motion and neck supple.   Skin:     General: Skin is warm and dry.      Coloration: Skin is not jaundiced.   Neurological:      General: No focal deficit present.      Mental Status: He is alert and oriented to person, place, and time. Mental status is at baseline.      Sensory: Sensation is intact.      Motor: Motor function is intact.      Coordination: Coordination is intact.   Psychiatric:    "      Attention and Perception: Attention and perception normal.         Mood and Affect: Mood and affect normal.         Speech: Speech normal.         Behavior: Behavior normal.         Judgment: Judgment normal.                Medications in the Emergency Department:  Medications   sodium chloride 0.9 % flush 10 mL (has no administration in time range)        Labs  Lab Results (last 24 hours)     Procedure Component Value Units Date/Time    CBC & Differential [[id number]]  (Abnormal) Collected: 10/19/22 0851    Specimen: Blood Updated: 10/19/22 0911    Narrative:      The following orders were created for panel order CBC & Differential.  Procedure                               Abnormality         Status                     ---------                               -----------         ------                     CBC Auto Differential[[id number]]        Abnormal            Final result                 Please view results for these tests on the individual orders.    Comprehensive Metabolic Panel [[id number]]  (Abnormal) Collected: 10/19/22 0851    Specimen: Blood Updated: 10/19/22 0929     Glucose 124 mg/dL      BUN 12 mg/dL      Creatinine 1.03 mg/dL      Sodium 139 mmol/L      Potassium 4.2 mmol/L      Chloride 101 mmol/L      CO2 26.4 mmol/L      Calcium 9.2 mg/dL      Total Protein 7.7 g/dL      Albumin 4.70 g/dL      ALT (SGPT) 58 U/L      AST (SGOT) 34 U/L      Alkaline Phosphatase 98 U/L      Total Bilirubin 0.3 mg/dL      Globulin 3.0 gm/dL      A/G Ratio 1.6 g/dL      BUN/Creatinine Ratio 11.7     Anion Gap 11.6 mmol/L      eGFR 95.4 mL/min/1.73      Comment: National Kidney Foundation and American Society of Nephrology (ASN) Task Force recommended calculation based on the Chronic Kidney Disease Epidemiology Collaboration (CKD-EPI) equation refit without adjustment for race.       Narrative:      GFR Normal >60  Chronic Kidney Disease <60  Kidney Failure <15      Lipase [[id number]]  (Normal) Collected: 10/19/22  0851    Specimen: Blood Updated: 10/19/22 0929     Lipase 27 U/L     CBC Auto Differential [[id number]]  (Abnormal) Collected: 10/19/22 0851    Specimen: Blood Updated: 10/19/22 0911     WBC 12.43 10*3/mm3      RBC 4.96 10*6/mm3      Hemoglobin 15.8 g/dL      Hematocrit 46.4 %      MCV 93.5 fL      MCH 31.9 pg      MCHC 34.1 g/dL      RDW 12.0 %      RDW-SD 41.8 fl      MPV 9.2 fL      Platelets 267 10*3/mm3      Neutrophil % 64.7 %      Lymphocyte % 26.4 %      Monocyte % 5.1 %      Eosinophil % 2.5 %      Basophil % 0.7 %      Immature Grans % 0.6 %      Neutrophils, Absolute 8.04 10*3/mm3      Lymphocytes, Absolute 3.28 10*3/mm3      Monocytes, Absolute 0.64 10*3/mm3      Eosinophils, Absolute 0.31 10*3/mm3      Basophils, Absolute 0.09 10*3/mm3      Immature Grans, Absolute 0.07 10*3/mm3      nRBC 0.0 /100 WBC     Urinalysis With Microscopic If Indicated (No Culture) - Urine, Clean Catch [[id number]]  (Abnormal) Collected: 10/19/22 0904    Specimen: Urine, Clean Catch Updated: 10/19/22 0930     Color, UA Yellow     Appearance, UA Clear     pH, UA 6.0     Specific Gravity, UA 1.025     Glucose, UA Negative     Ketones, UA Negative     Bilirubin, UA Negative     Blood, UA Trace     Protein, UA Negative     Leuk Esterase, UA Negative     Nitrite, UA Negative     Urobilinogen, UA 0.2 E.U./dL    Urinalysis, Microscopic Only - Urine, Clean Catch [[id number]]  (Abnormal) Collected: 10/19/22 0904    Specimen: Urine, Clean Catch Updated: 10/19/22 0930     RBC, UA 0-2 /HPF      WBC, UA 0-2 /HPF      Bacteria, UA Trace /HPF      Squamous Epithelial Cells, UA 0-2 /HPF      Hyaline Casts, UA None Seen /LPF      Mucus, UA Small/1+ /HPF      Methodology Automated Microscopy           Imaging:  XR Abdomen Flat & Upright    Result Date: 10/19/2022  PROCEDURE: XR ABDOMEN FLAT AND UPRIGHT  COMPARISON: [facility], CR, ABDOMEN SINGLE AP, 8/01/2017, 8:23.  INDICATIONS: evaluate for air fluid levels. left side  abdominal pain for 3 days.  FINDINGS:  Limited imaging of the lung bases is grossly unremarkable.  Patient is status post cholecystectomy.  There is a normal bowel gas pattern.  Phleboliths are noted within the pelvis.  A surgical clip is noted within the left side of the pelvis.  Osseous structures demonstrate a transitional vertebra at L5 with partial sacralization.  No acute osseous abnormality is noted        1. Normal bowel gas pattern     NERY MONTGOMERY MD       Electronically Signed and Approved By: NERY MONTGOMERY MD on 10/19/2022 at 11:13               Procedures:  Procedures    Progress                            Medical Decision Making:  MDM  Number of Diagnoses or Management Options  Left upper quadrant abdominal pain: new and requires workup     Amount and/or Complexity of Data Reviewed  Decide to obtain previous medical records or to obtain history from someone other than the patient: yes  Independent visualization of images, tracings, or specimens: yes    Risk of Complications, Morbidity, and/or Mortality  Presenting problems: moderate  Management options: low    Patient Progress  Patient progress: stable       Final diagnoses:   Left upper quadrant abdominal pain        Disposition:  ED Disposition     ED Disposition   Discharge    Condition   Stable    Comment   --             This medical record created using voice recognition software.        Documentation assistance provided by Diana Machado acting as scribe for Gume Conway MD. Information recorded by the scribe was done at my direction and has been verified and validated by me.          Diana Machado  10/19/22 0931       Diana Machado  10/19/22 0932       Diana Machado  10/19/22 0938       Diana Machado  10/19/22 1009       Gume Conway MD  10/19/22 1128

## 2022-10-19 NOTE — DISCHARGE INSTRUCTIONS
Return to the emergency department immediately for fever, bloody stools, worsening abdominal pain.  Stay well-hydrated and eat a bland diet as we discussed.  Follow-up your family doctor tomorrow if symptoms persist to discuss further work-up and/or specialist referral.